# Patient Record
Sex: FEMALE | Race: WHITE | NOT HISPANIC OR LATINO | Employment: OTHER | URBAN - METROPOLITAN AREA
[De-identification: names, ages, dates, MRNs, and addresses within clinical notes are randomized per-mention and may not be internally consistent; named-entity substitution may affect disease eponyms.]

---

## 2023-04-17 DIAGNOSIS — I10 PRIMARY HYPERTENSION: ICD-10-CM

## 2023-04-17 PROBLEM — G89.29 CHRONIC NECK PAIN: Status: ACTIVE | Noted: 2023-04-17

## 2023-04-17 PROBLEM — I82.409 DEEP VENOUS THROMBOSIS (MULTI): Status: ACTIVE | Noted: 2023-04-17

## 2023-04-17 PROBLEM — M54.2 CHRONIC NECK PAIN: Status: ACTIVE | Noted: 2023-04-17

## 2023-04-17 PROBLEM — E55.9 VITAMIN D DEFICIENCY: Status: ACTIVE | Noted: 2023-04-17

## 2023-04-17 RX ORDER — LISINOPRIL 40 MG/1
40 TABLET ORAL DAILY
Qty: 90 TABLET | Refills: 1 | Status: SHIPPED | OUTPATIENT
Start: 2023-04-17 | End: 2023-10-12 | Stop reason: SDUPTHER

## 2023-04-17 RX ORDER — RIVAROXABAN 20 MG/1
1 TABLET, FILM COATED ORAL DAILY
COMMUNITY
Start: 2022-10-11 | End: 2023-08-14

## 2023-04-17 RX ORDER — ACETAMINOPHEN 500 MG
1 TABLET ORAL DAILY
COMMUNITY
Start: 2021-10-07

## 2023-04-17 RX ORDER — HYDROCORTISONE 25 MG/G
CREAM TOPICAL
COMMUNITY
Start: 2022-08-29

## 2023-04-17 RX ORDER — CETIRIZINE HCL 1 MG/ML
1 SOLUTION, ORAL ORAL DAILY
COMMUNITY
Start: 2022-08-29 | End: 2023-08-14

## 2023-04-17 RX ORDER — LISINOPRIL 40 MG/1
1 TABLET ORAL DAILY
COMMUNITY
End: 2023-04-17 | Stop reason: SDUPTHER

## 2023-07-19 PROBLEM — R73.03 PRE-DIABETES: Status: ACTIVE | Noted: 2023-07-19

## 2023-07-19 PROBLEM — M47.812 OA (OSTEOARTHRITIS) OF NECK: Status: ACTIVE | Noted: 2023-07-19

## 2023-07-19 PROBLEM — M50.30 MILD DEGENERATION OF CERVICAL INTERVERTEBRAL DISC: Status: ACTIVE | Noted: 2023-07-19

## 2023-07-19 PROBLEM — M81.0 OSTEOPOROSIS: Status: ACTIVE | Noted: 2023-07-19

## 2023-07-24 ENCOUNTER — APPOINTMENT (OUTPATIENT)
Dept: PRIMARY CARE | Facility: CLINIC | Age: 73
End: 2023-07-24

## 2023-07-25 ENCOUNTER — APPOINTMENT (OUTPATIENT)
Dept: PRIMARY CARE | Facility: CLINIC | Age: 73
End: 2023-07-25

## 2023-08-14 ENCOUNTER — OFFICE VISIT (OUTPATIENT)
Dept: PRIMARY CARE | Facility: CLINIC | Age: 73
End: 2023-08-14
Payer: MEDICARE

## 2023-08-14 VITALS
HEART RATE: 54 BPM | OXYGEN SATURATION: 97 % | WEIGHT: 159.2 LBS | DIASTOLIC BLOOD PRESSURE: 68 MMHG | RESPIRATION RATE: 16 BRPM | SYSTOLIC BLOOD PRESSURE: 120 MMHG | HEIGHT: 64 IN | TEMPERATURE: 96.3 F | BODY MASS INDEX: 27.18 KG/M2

## 2023-08-14 DIAGNOSIS — R73.03 PRE-DIABETES: ICD-10-CM

## 2023-08-14 DIAGNOSIS — Z12.31 ENCOUNTER FOR SCREENING MAMMOGRAM FOR BREAST CANCER: ICD-10-CM

## 2023-08-14 DIAGNOSIS — Z00.00 ROUTINE GENERAL MEDICAL EXAMINATION AT HEALTH CARE FACILITY: ICD-10-CM

## 2023-08-14 DIAGNOSIS — I10 PRIMARY HYPERTENSION: Primary | ICD-10-CM

## 2023-08-14 PROBLEM — I82.409 DEEP VENOUS THROMBOSIS (MULTI): Status: RESOLVED | Noted: 2023-04-17 | Resolved: 2023-08-14

## 2023-08-14 PROBLEM — T36.4X5A: Status: ACTIVE | Noted: 2023-08-14

## 2023-08-14 PROBLEM — K20.80: Status: ACTIVE | Noted: 2023-08-14

## 2023-08-14 PROBLEM — R73.01 ELEVATED FASTING GLUCOSE: Status: ACTIVE | Noted: 2022-08-08

## 2023-08-14 PROBLEM — L29.89 PRURITIC ERYTHEMATOUS RASH: Status: RESOLVED | Noted: 2023-08-14 | Resolved: 2023-08-14

## 2023-08-14 PROBLEM — L03.221 CELLULITIS OF NECK: Status: ACTIVE | Noted: 2023-08-14

## 2023-08-14 PROBLEM — I49.9 CARDIAC ARRHYTHMIA: Status: ACTIVE | Noted: 2023-08-14

## 2023-08-14 PROBLEM — L24.7 IRRITANT CONTACT DERMATITIS DUE TO PLANT: Status: RESOLVED | Noted: 2023-08-14 | Resolved: 2023-08-14

## 2023-08-14 PROBLEM — S46.019A TRAUMATIC ROTATOR CUFF TEAR: Status: ACTIVE | Noted: 2023-05-15

## 2023-08-14 PROBLEM — L29.9 ITCHY SKIN: Status: RESOLVED | Noted: 2023-08-14 | Resolved: 2023-08-14

## 2023-08-14 PROBLEM — R71.8 ELEVATED MCV: Status: ACTIVE | Noted: 2023-08-14

## 2023-08-14 PROBLEM — M67.911 TENDINOPATHY OF RIGHT ROTATOR CUFF: Status: ACTIVE | Noted: 2023-08-14

## 2023-08-14 PROBLEM — L30.4 INTERTRIGO: Status: ACTIVE | Noted: 2023-08-14

## 2023-08-14 PROBLEM — L29.8 PRURITIC ERYTHEMATOUS RASH: Status: RESOLVED | Noted: 2023-08-14 | Resolved: 2023-08-14

## 2023-08-14 PROCEDURE — 3078F DIAST BP <80 MM HG: CPT | Performed by: PHYSICIAN ASSISTANT

## 2023-08-14 PROCEDURE — 3074F SYST BP LT 130 MM HG: CPT | Performed by: PHYSICIAN ASSISTANT

## 2023-08-14 PROCEDURE — G0439 PPPS, SUBSEQ VISIT: HCPCS | Performed by: PHYSICIAN ASSISTANT

## 2023-08-14 PROCEDURE — 1160F RVW MEDS BY RX/DR IN RCRD: CPT | Performed by: PHYSICIAN ASSISTANT

## 2023-08-14 PROCEDURE — 1036F TOBACCO NON-USER: CPT | Performed by: PHYSICIAN ASSISTANT

## 2023-08-14 PROCEDURE — 1159F MED LIST DOCD IN RCRD: CPT | Performed by: PHYSICIAN ASSISTANT

## 2023-08-14 PROCEDURE — 1170F FXNL STATUS ASSESSED: CPT | Performed by: PHYSICIAN ASSISTANT

## 2023-08-14 ASSESSMENT — ACTIVITIES OF DAILY LIVING (ADL)
DRESSING: INDEPENDENT
MANAGING_FINANCES: INDEPENDENT
GROCERY_SHOPPING: INDEPENDENT
TAKING_MEDICATION: INDEPENDENT
BATHING: INDEPENDENT
DOING_HOUSEWORK: INDEPENDENT

## 2023-08-14 ASSESSMENT — ENCOUNTER SYMPTOMS
SHORTNESS OF BREATH: 0
VOMITING: 0
DEPRESSION: 0
DIARRHEA: 0
CONSTIPATION: 0
EYE PAIN: 0
NAUSEA: 0
OCCASIONAL FEELINGS OF UNSTEADINESS: 0
ABDOMINAL PAIN: 0
MYALGIAS: 0
ARTHRALGIAS: 0
LOSS OF SENSATION IN FEET: 0

## 2023-08-14 ASSESSMENT — PATIENT HEALTH QUESTIONNAIRE - PHQ9
SUM OF ALL RESPONSES TO PHQ9 QUESTIONS 1 AND 2: 0
1. LITTLE INTEREST OR PLEASURE IN DOING THINGS: NOT AT ALL
2. FEELING DOWN, DEPRESSED OR HOPELESS: NOT AT ALL

## 2023-08-14 NOTE — PROGRESS NOTES
"Subjective   Reason for Visit: Cherie Eugene is an 72 y.o. female here for a Medicare Wellness visit.     Past Medical, Surgical, and Family History reviewed and updated in chart.    Reviewed all medications by prescribing practitioner or clinical pharmacist (such as prescriptions, OTCs, herbal therapies and supplements) and documented in the medical record.    HPI    Patient Care Team:  Milka Frye PA-C as PCP - General  Milka Frye PA-C as PCP - [employer] PCP     Medicare Wellness:  - Lives at home in [place] with  - home life is good    - Hobbies/ activities: daughter and grandkids live nearby   - Exercise: walks every day at the park about 30 minutes   - Diet: balanced, no sugar, not much bread, eating grass fed beef now   - Sexual hx: not anymore   - Tobacco: never   - Alcohol: wine or beer every night 1-2 glasses   - Illicit drugs: no   - Depression/ mood: no   - Cognitive:  - Hearing:   - Falls:   - Healthcare POA/ advanced directives:  - Code status desired:   - Labs: DUE - just wants lipid and A1c   - DEXA: UTD   - Colon CA: UTD   - Mamm: DUE   - Shingrix: DUE, DECLINED   - Pneumovax/ Prevnar: UTD   - Td: DUE   - COVID-19 vax: DUE, DECLINED       Review of Systems   Eyes:  Negative for pain and visual disturbance.   Respiratory:  Negative for shortness of breath.    Cardiovascular:  Negative for chest pain.   Gastrointestinal:  Negative for abdominal pain, constipation, diarrhea, nausea and vomiting.   Musculoskeletal:  Negative for arthralgias and myalgias.   Skin:  Negative for rash.       Objective   Vitals:  /68   Pulse 54   Temp 35.7 °C (96.3 °F)   Resp 16   Ht 1.626 m (5' 4\")   Wt 72.2 kg (159 lb 3.2 oz)   SpO2 97%   BMI 27.33 kg/m²       Physical Exam  Constitutional:       General: She is not in acute distress.     Appearance: She is normal weight.   HENT:      Head: Normocephalic.      Right Ear: Tympanic membrane and ear canal normal.      Left Ear: " Tympanic membrane and ear canal normal.      Nose: Nose normal.      Mouth/Throat:      Mouth: Mucous membranes are moist.      Pharynx: Oropharynx is clear.   Eyes:      Extraocular Movements: Extraocular movements intact.      Conjunctiva/sclera: Conjunctivae normal.      Pupils: Pupils are equal, round, and reactive to light.   Cardiovascular:      Rate and Rhythm: Normal rate and regular rhythm.      Pulses: Normal pulses.      Heart sounds: No murmur heard.  Pulmonary:      Effort: Pulmonary effort is normal.      Breath sounds: Normal breath sounds. No wheezing, rhonchi or rales.   Abdominal:      General: Bowel sounds are normal. There is no distension.      Palpations: Abdomen is soft. There is no mass.      Tenderness: There is no abdominal tenderness. There is no guarding.   Musculoskeletal:         General: Normal range of motion.      Cervical back: Neck supple.   Lymphadenopathy:      Cervical: No cervical adenopathy.   Skin:     General: Skin is warm and dry.      Findings: No lesion or rash.   Neurological:      General: No focal deficit present.      Mental Status: She is alert.      Gait: Gait normal.   Psychiatric:         Mood and Affect: Mood normal.         Assessment/Plan   Problem List Items Addressed This Visit       HTN (hypertension) - Primary    Relevant Orders    Lipid Panel    Comprehensive Metabolic Panel    Pre-diabetes    Relevant Orders    Hemoglobin A1C    Lipid Panel     Other Visit Diagnoses       Encounter for screening mammogram for breast cancer        Relevant Orders    BI mammo bilateral screening tomosynthesis

## 2023-08-18 ENCOUNTER — TELEPHONE (OUTPATIENT)
Dept: PRIMARY CARE | Facility: CLINIC | Age: 73
End: 2023-08-18

## 2023-08-18 NOTE — TELEPHONE ENCOUNTER
Patient phones the office today w/ c/o pain in her right foot and leg when it is moved.     Patient denies any swelling and warm to the touch.     Patient did have a blood clot in this leg about one year ago and she states it does not feel the same.     Patient was advised to go to ER and/or [facility] today if needed.     Please advise.     Thank you.

## 2023-08-21 NOTE — PROGRESS NOTES
"Subjective   Patient ID: Cherie Eugene is a 72 y.o. female who presents for Pain (Pt here today for right foot pain/swelling that happened last Thursday-Fri but today much better. Could barely walk on it but better now. ) and Medication Question (Pt wants to know if she can take Turmeric with all her other meds. ).    HPI     Right foot/ ankle pain:   - Onset: Thursday  - Description: painful and initially swollen   - Location: right ankle   - Pain severity: 2/10 now so MUCH better than it was the other day   - Aggravated by: walking up and down stairs (which she does a lot)   - Txs tried: Advil, soak in Epson salts   - Previous injury/ surgery: no     Review of Systems   Musculoskeletal:  Positive for arthralgias (right ankle pain).       Objective   /86   Pulse 53   Temp 36.2 °C (97.1 °F)   Resp 18   Ht 1.626 m (5' 4\")   Wt 72.1 kg (159 lb)   SpO2 99%   BMI 27.29 kg/m²     Physical Exam  Musculoskeletal:      Right ankle: No swelling, deformity, ecchymosis or lacerations. Tenderness present over the lateral malleolus. Normal range of motion.      Right Achilles Tendon: Normal.       Assessment/Plan   Problem List Items Addressed This Visit    None  Visit Diagnoses       Right ankle tendonitis    -  Primary        Sxs greatly improved at this point  Encouraged continued rest  Add ice and topical diclofenac for discomfort   Follow up if sxs worsen again or any concerns        "

## 2023-08-22 ENCOUNTER — OFFICE VISIT (OUTPATIENT)
Dept: PRIMARY CARE | Facility: CLINIC | Age: 73
End: 2023-08-22
Payer: MEDICARE

## 2023-08-22 VITALS
HEART RATE: 53 BPM | BODY MASS INDEX: 27.14 KG/M2 | DIASTOLIC BLOOD PRESSURE: 86 MMHG | SYSTOLIC BLOOD PRESSURE: 128 MMHG | WEIGHT: 159 LBS | HEIGHT: 64 IN | OXYGEN SATURATION: 99 % | RESPIRATION RATE: 18 BRPM | TEMPERATURE: 97.1 F

## 2023-08-22 DIAGNOSIS — M77.51 RIGHT ANKLE TENDONITIS: Primary | ICD-10-CM

## 2023-08-22 PROCEDURE — 99212 OFFICE O/P EST SF 10 MIN: CPT | Performed by: PHYSICIAN ASSISTANT

## 2023-08-22 PROCEDURE — 1159F MED LIST DOCD IN RCRD: CPT | Performed by: PHYSICIAN ASSISTANT

## 2023-08-22 PROCEDURE — 3079F DIAST BP 80-89 MM HG: CPT | Performed by: PHYSICIAN ASSISTANT

## 2023-08-22 PROCEDURE — 3074F SYST BP LT 130 MM HG: CPT | Performed by: PHYSICIAN ASSISTANT

## 2023-08-22 PROCEDURE — 1036F TOBACCO NON-USER: CPT | Performed by: PHYSICIAN ASSISTANT

## 2023-08-22 PROCEDURE — 1160F RVW MEDS BY RX/DR IN RCRD: CPT | Performed by: PHYSICIAN ASSISTANT

## 2023-08-22 ASSESSMENT — ENCOUNTER SYMPTOMS: ARTHRALGIAS: 1

## 2023-09-11 ENCOUNTER — APPOINTMENT (OUTPATIENT)
Dept: PRIMARY CARE | Facility: CLINIC | Age: 73
End: 2023-09-11

## 2023-10-12 DIAGNOSIS — I10 PRIMARY HYPERTENSION: ICD-10-CM

## 2023-10-12 RX ORDER — LISINOPRIL 40 MG/1
40 TABLET ORAL DAILY
Qty: 90 TABLET | Refills: 1 | Status: SHIPPED | OUTPATIENT
Start: 2023-10-12

## 2023-10-12 NOTE — TELEPHONE ENCOUNTER
Patient phones the office today w/ request to refill her Lisinopril 40mg 1 TAB every day for a 90 day supply to be sent to [facility] on [address] in [place].     Thank you.

## 2023-12-18 ENCOUNTER — OFFICE VISIT (OUTPATIENT)
Dept: PRIMARY CARE | Facility: CLINIC | Age: 73
End: 2023-12-18
Payer: MEDICARE

## 2023-12-18 ENCOUNTER — LAB (OUTPATIENT)
Dept: LAB | Facility: LAB | Age: 73
End: 2023-12-18
Payer: MEDICARE

## 2023-12-18 VITALS
SYSTOLIC BLOOD PRESSURE: 128 MMHG | WEIGHT: 162 LBS | RESPIRATION RATE: 18 BRPM | DIASTOLIC BLOOD PRESSURE: 86 MMHG | OXYGEN SATURATION: 97 % | TEMPERATURE: 95.9 F | HEIGHT: 64 IN | HEART RATE: 69 BPM | BODY MASS INDEX: 27.66 KG/M2

## 2023-12-18 DIAGNOSIS — I10 PRIMARY HYPERTENSION: ICD-10-CM

## 2023-12-18 DIAGNOSIS — M25.562 PAIN IN BOTH KNEES, UNSPECIFIED CHRONICITY: Primary | ICD-10-CM

## 2023-12-18 DIAGNOSIS — M25.561 PAIN IN BOTH KNEES, UNSPECIFIED CHRONICITY: Primary | ICD-10-CM

## 2023-12-18 DIAGNOSIS — R73.03 PRE-DIABETES: ICD-10-CM

## 2023-12-18 DIAGNOSIS — E55.9 VITAMIN D DEFICIENCY: ICD-10-CM

## 2023-12-18 LAB
ALBUMIN SERPL BCP-MCNC: 4.7 G/DL (ref 3.4–5)
ALP SERPL-CCNC: 58 U/L (ref 33–136)
ALT SERPL W P-5'-P-CCNC: 13 U/L (ref 7–45)
ANION GAP SERPL CALC-SCNC: 14 MMOL/L (ref 10–20)
AST SERPL W P-5'-P-CCNC: 15 U/L (ref 9–39)
BILIRUB SERPL-MCNC: 0.6 MG/DL (ref 0–1.2)
BUN SERPL-MCNC: 20 MG/DL (ref 6–23)
CALCIUM SERPL-MCNC: 9.7 MG/DL (ref 8.6–10.3)
CHLORIDE SERPL-SCNC: 102 MMOL/L (ref 98–107)
CHOLEST SERPL-MCNC: 245 MG/DL (ref 0–199)
CHOLESTEROL/HDL RATIO: 2.8
CO2 SERPL-SCNC: 28 MMOL/L (ref 21–32)
CREAT SERPL-MCNC: 0.93 MG/DL (ref 0.5–1.05)
ERYTHROCYTE [DISTWIDTH] IN BLOOD BY AUTOMATED COUNT: 12.8 % (ref 11.5–14.5)
EST. AVERAGE GLUCOSE BLD GHB EST-MCNC: 108 MG/DL
GFR SERPL CREATININE-BSD FRML MDRD: 65 ML/MIN/1.73M*2
GLUCOSE SERPL-MCNC: 89 MG/DL (ref 74–99)
HBA1C MFR BLD: 5.4 %
HCT VFR BLD AUTO: 45 % (ref 36–46)
HDLC SERPL-MCNC: 87.4 MG/DL
HGB BLD-MCNC: 15 G/DL (ref 12–16)
LDLC SERPL CALC-MCNC: 138 MG/DL
MCH RBC QN AUTO: 32.7 PG (ref 26–34)
MCHC RBC AUTO-ENTMCNC: 33.3 G/DL (ref 32–36)
MCV RBC AUTO: 98 FL (ref 80–100)
NON HDL CHOLESTEROL: 158 MG/DL (ref 0–149)
NRBC BLD-RTO: 0 /100 WBCS (ref 0–0)
PLATELET # BLD AUTO: 245 X10*3/UL (ref 150–450)
POTASSIUM SERPL-SCNC: 4.4 MMOL/L (ref 3.5–5.3)
PROT SERPL-MCNC: 6.9 G/DL (ref 6.4–8.2)
RBC # BLD AUTO: 4.59 X10*6/UL (ref 4–5.2)
SODIUM SERPL-SCNC: 140 MMOL/L (ref 136–145)
TRIGL SERPL-MCNC: 96 MG/DL (ref 0–149)
VLDL: 19 MG/DL (ref 0–40)
WBC # BLD AUTO: 6.2 X10*3/UL (ref 4.4–11.3)

## 2023-12-18 PROCEDURE — 80053 COMPREHEN METABOLIC PANEL: CPT

## 2023-12-18 PROCEDURE — 1160F RVW MEDS BY RX/DR IN RCRD: CPT | Performed by: PHYSICIAN ASSISTANT

## 2023-12-18 PROCEDURE — 1159F MED LIST DOCD IN RCRD: CPT | Performed by: PHYSICIAN ASSISTANT

## 2023-12-18 PROCEDURE — 3074F SYST BP LT 130 MM HG: CPT | Performed by: PHYSICIAN ASSISTANT

## 2023-12-18 PROCEDURE — 3079F DIAST BP 80-89 MM HG: CPT | Performed by: PHYSICIAN ASSISTANT

## 2023-12-18 PROCEDURE — 99212 OFFICE O/P EST SF 10 MIN: CPT | Performed by: PHYSICIAN ASSISTANT

## 2023-12-18 PROCEDURE — 36415 COLL VENOUS BLD VENIPUNCTURE: CPT

## 2023-12-18 PROCEDURE — 85027 COMPLETE CBC AUTOMATED: CPT

## 2023-12-18 PROCEDURE — 83036 HEMOGLOBIN GLYCOSYLATED A1C: CPT

## 2023-12-18 PROCEDURE — 80061 LIPID PANEL: CPT

## 2023-12-18 PROCEDURE — 1036F TOBACCO NON-USER: CPT | Performed by: PHYSICIAN ASSISTANT

## 2023-12-18 ASSESSMENT — ENCOUNTER SYMPTOMS: ARTHRALGIAS: 1

## 2023-12-18 NOTE — PROGRESS NOTES
"Subjective   Patient ID: Cherie Eugene is a 73 y.o. female who presents for Follow-up (Pt here today for a follow up and needing some lab orders. ) and Knee Pain (Pt knees have been bothering her; bilateral knee pain but left is worse past couple days; probably due to weather. ).      B/l knee pain:   - Onset: Past few days  - Description: Typical arthritic pains, dull, shooting pains  - Location: Bilateral but L worse than right  - Associated with: Increased activity or higher intensity activity  - Aggravated by: Getting up from seat, climbing stairs  - Txs tried: Advil, got a knee injection in R knee 4-5 years ago which resolved the pain, but seems to feel this pain now is somewhat different .   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       Objective   /86   Pulse 69   Temp 35.5 °C (95.9 °F)   Resp 18   Ht 1.626 m (5' 4\")   Wt 73.5 kg (162 lb)   SpO2 97%   BMI 27.81 kg/m²     Physical Exam  Constitutional:       Appearance: Normal appearance.   Cardiovascular:      Rate and Rhythm: Normal rate and regular rhythm.      Pulses: Normal pulses.      Heart sounds: Normal heart sounds. No murmur heard.  Pulmonary:      Effort: Pulmonary effort is normal.      Breath sounds: Normal breath sounds.   Musculoskeletal:      Right knee: No swelling, deformity, effusion, erythema, bony tenderness or crepitus. Normal range of motion. No tenderness. No LCL laxity, MCL laxity, ACL laxity or PCL laxity.      Instability Tests: Anterior drawer test negative. Medial Fanny test negative and lateral Fanny test negative.      Left knee: Crepitus present. No swelling, deformity, effusion, erythema or bony tenderness. Normal range of motion. Tenderness present over the medial joint line. No LCL laxity, MCL laxity, ACL laxity or PCL laxity.     Instability Tests: Anterior drawer " test negative. Medial Fanny test negative and lateral Fanny test negative.   Neurological:      Mental Status: She is alert.   Psychiatric:         Mood and Affect: Mood and affect normal.       Assessment/Plan     Problem List Items Addressed This Visit       HTN (hypertension)    Overview     - Has been stable and well controlled on lisinopril 40 mg          Current Assessment & Plan     - continue current tx plan         Relevant Orders    CBC    Vitamin D deficiency    Pain in both knees - Primary    Current Assessment & Plan     - Likely osteoarithis   - Will check b/l knee xrays  - Advised conservative measures - topical Voltaren, rest  - Offered P.T. - pt declined for now   - Will follow up if sxs worsen, persist or any new concerns          Relevant Orders    XR knee 3 views bilateral       I was present with the PA student who participated in the documentation of this note. I have personally seen and re-examined the patient and performed the medical decision-making components (assessment and plan of care). I have reviewed the PA student documentation and verified the findings in the note as written with additions or exceptions as stated in the body of this note.    Milka Frye PA-C

## 2023-12-18 NOTE — ASSESSMENT & PLAN NOTE
- Likely osteoarithis   - Will check b/l knee xrays  - Advised conservative measures - topical Voltaren, rest  - Offered P.T. - pt declined for now   - Will follow up if sxs worsen, persist or any new concerns

## 2023-12-18 NOTE — PROGRESS NOTES
"Subjective   Patient ID: Cherie Eugene is a 73 y.o. female who presents for Follow-up (Pt here today for a follow up and needing some lab orders. ) and Knee Pain (Pt knees have been bothering her; bilateral knee pain but left is worse past couple days; probably due to weather. ).    HPI     B/l knee pain:   - Onset:   - Description:   - Location:   - Associated with:  - Aggravated by:  - Txs tried:   - Previous injury/ surgery:     Review of Systems   Musculoskeletal:  Positive for arthralgias.       Objective   /86   Pulse 69   Temp 35.5 °C (95.9 °F)   Resp 18   Ht 1.626 m (5' 4\")   Wt 73.5 kg (162 lb)   SpO2 97%   BMI 27.81 kg/m²     Physical Exam  Constitutional:       Appearance: Normal appearance.   Cardiovascular:      Rate and Rhythm: Normal rate and regular rhythm.      Pulses: Normal pulses.      Heart sounds: Normal heart sounds. No murmur heard.  Pulmonary:      Effort: Pulmonary effort is normal.      Breath sounds: Normal breath sounds.   Neurological:      Mental Status: She is alert.   Psychiatric:         Mood and Affect: Mood and affect normal.         Assessment/Plan     Problem List Items Addressed This Visit    None  Visit Diagnoses       Pain in both knees, unspecified chronicity    -  Primary            "

## 2023-12-21 ENCOUNTER — HOSPITAL ENCOUNTER (OUTPATIENT)
Dept: RADIOLOGY | Facility: HOSPITAL | Age: 73
Discharge: HOME | End: 2023-12-21
Payer: MEDICARE

## 2023-12-21 DIAGNOSIS — M25.561 PAIN IN BOTH KNEES, UNSPECIFIED CHRONICITY: ICD-10-CM

## 2023-12-21 DIAGNOSIS — M25.562 PAIN IN BOTH KNEES, UNSPECIFIED CHRONICITY: ICD-10-CM

## 2023-12-21 PROCEDURE — 73562 X-RAY EXAM OF KNEE 3: CPT | Mod: BILATERAL PROCEDURE | Performed by: RADIOLOGY

## 2023-12-21 PROCEDURE — 73562 X-RAY EXAM OF KNEE 3: CPT | Mod: 50

## 2024-01-02 DIAGNOSIS — M17.0 PRIMARY OSTEOARTHRITIS OF BOTH KNEES: Primary | ICD-10-CM

## 2024-01-02 RX ORDER — MELOXICAM 15 MG/1
15 TABLET ORAL DAILY
Qty: 90 TABLET | Refills: 1 | Status: SHIPPED | OUTPATIENT
Start: 2024-01-02

## 2024-02-15 ENCOUNTER — OFFICE VISIT (OUTPATIENT)
Dept: ORTHOPEDIC SURGERY | Facility: CLINIC | Age: 74
End: 2024-02-15
Payer: COMMERCIAL

## 2024-02-15 ENCOUNTER — HOSPITAL ENCOUNTER (OUTPATIENT)
Dept: RADIOLOGY | Facility: CLINIC | Age: 74
Discharge: HOME | End: 2024-02-15
Payer: COMMERCIAL

## 2024-02-15 DIAGNOSIS — M70.61 TROCHANTERIC BURSITIS OF RIGHT HIP: ICD-10-CM

## 2024-02-15 DIAGNOSIS — M54.50 LUMBAR PAIN: ICD-10-CM

## 2024-02-15 PROCEDURE — 72100 X-RAY EXAM L-S SPINE 2/3 VWS: CPT

## 2024-02-15 PROCEDURE — 99214 OFFICE O/P EST MOD 30 MIN: CPT | Performed by: FAMILY MEDICINE

## 2024-02-15 PROCEDURE — 1159F MED LIST DOCD IN RCRD: CPT | Performed by: FAMILY MEDICINE

## 2024-02-15 PROCEDURE — 73502 X-RAY EXAM HIP UNI 2-3 VIEWS: CPT | Mod: RT

## 2024-02-15 PROCEDURE — 73502 X-RAY EXAM HIP UNI 2-3 VIEWS: CPT | Mod: RIGHT SIDE | Performed by: FAMILY MEDICINE

## 2024-02-15 PROCEDURE — 72100 X-RAY EXAM L-S SPINE 2/3 VWS: CPT | Performed by: FAMILY MEDICINE

## 2024-02-15 PROCEDURE — 1036F TOBACCO NON-USER: CPT | Performed by: FAMILY MEDICINE

## 2024-02-15 PROCEDURE — 1160F RVW MEDS BY RX/DR IN RCRD: CPT | Performed by: FAMILY MEDICINE

## 2024-02-15 RX ORDER — METHYLPREDNISOLONE 4 MG/1
TABLET ORAL
Qty: 1 EACH | Refills: 0 | Status: SHIPPED | OUTPATIENT
Start: 2024-02-15

## 2024-02-15 NOTE — PROGRESS NOTES
Acute Injury New Patient Visit    CC:   Chief Complaint   Patient presents with    Lower Back - Pain     Rt hip low back pain   Xrays today       HPI: Cherie is a 73 y.o.female who presents today with new complaints of lateral sided right hip pain.  She points to the greater trochanter bursa region as area most pain discomfort.  She denies any numbness tingling or burning.  She states 3 days onset of pain without any obvious injury or trauma.  She states she is on meloxicam chronically for her knees.  She has difficulty navigating up and down stairs as well as sleeping on her right side.  Symptoms have been going on and progressing over the last 3 days.        Review of Systems   GENERAL: Negative for malaise, significant weight loss, fever  MUSCULOSKELETAL: See HPI  NEURO: Negative for numbness / tingling     Past Medical History  Past Medical History:   Diagnosis Date    Irritant contact dermatitis due to plant 08/14/2023    Itchy skin 08/14/2023    Pain in right shoulder 01/27/2020    Acute pain of right shoulder    Personal history of other benign neoplasm     History of other benign neoplasm    Personal history of other diseases of the circulatory system     History of sinus bradycardia    Pruritic erythematous rash 08/14/2023       Medication review  Medication Documentation Review Audit       Reviewed by Cole C Budinsky, MD (Physician) on 02/15/24 at 1202      Medication Order Taking? Sig Documenting Provider Last Dose Status   cholecalciferol (Vitamin D-3) 50 mcg (2,000 unit) capsule [id number] No Take 1 capsule (50 mcg) by mouth once daily. Historical Provider, MD Taking Active   hydrocortisone 2.5 % cream [id number] No Apply topically. Historical Provider, MD Taking Active   lisinopril 40 mg tablet [id number] No Take 1 tablet (40 mg) by mouth once daily. Milka Frye PA-C Taking Active   meloxicam (Mobic) 15 mg tablet [id number]  Take 1 tablet (15 mg) by mouth once daily. Milka Frye PA-C  Active    methylPREDNISolone (Medrol Dospak) 4 mg tablets [id number]  Follow schedule on package instructions Cole C Budinsky, MD  Active                    Allergies  No Known Allergies    Social History  Social History     Socioeconomic History    Marital status:      Spouse name: Not on file    Number of children: Not on file    Years of education: Not on file    Highest education level: Not on file   Occupational History    Not on file   Tobacco Use    Smoking status: Never    Smokeless tobacco: Never   Vaping Use    Vaping Use: Never used   Substance and Sexual Activity    Alcohol use: Yes     Comment: occ    Drug use: Never    Sexual activity: Not on file   Other Topics Concern    Not on file   Social History Narrative    Not on file     Social Determinants of Health     Financial Resource Strain: Not on file   Food Insecurity: Not on file   Transportation Needs: Not on file   Physical Activity: Not on file   Stress: Not on file   Social Connections: Not on file   Intimate Partner Violence: Not on file   Housing Stability: Not on file       Surgical History  Past Surgical History:   Procedure Laterality Date    APPENDECTOMY      Appendectomy    WISDOM TOOTH EXTRACTION  2001    [place] tooth extraction       Physical Exam:  GENERAL:  Patient is awake, alert, and oriented to person place and time.  Patient appears well nourished and well kept.  Affect Calm, Not Acutely Distressed.  HEENT:  Normocephalic, Atraumatic, EOMI  CARDIOVASCULAR:  Hemodynamically stable.  RESPIRATORY:  Normal respirations with unlabored breathing.  NEURO: Gross sensation intact to the lower extremities bilaterally.  Extremity: Right hip exam demonstrates negative logroll tenderness palpation over the greater trochanter bursa minimal IT band pain proximally.  She has normal hip flexion internal and external rotation all with reproduction of symptoms.  Mild antalgic gait noted.      Diagnostics: X-rays today negative for fracture or  dislocation with mild osteoarthritis about the hip        Procedure: None  Procedures    Assessment:   Problem List Items Addressed This Visit    None  Visit Diagnoses       Lumbar pain        Relevant Medications    methylPREDNISolone (Medrol Dospak) 4 mg tablets    Other Relevant Orders    XR hip right with pelvis when performed 2 or 3 views    XR lumbar spine 2-3 views    Trochanteric bursitis of right hip        Relevant Medications    methylPREDNISolone (Medrol Dospak) 4 mg tablets             Plan: At this time we discussed conservative management to the inflammation and irritation to the lateral side of the hip.  Will offer her short course of an oral steroid pack in addition to recommendations for icing and some home exercises to work on range of motion and stretching.  Will see her back in 2 weeks if she still having pain and discomfort we will consider an injection at that time.  No need for repeat x-rays.  Orders Placed This Encounter    XR hip right with pelvis when performed 2 or 3 views    XR lumbar spine 2-3 views    methylPREDNISolone (Medrol Dospak) 4 mg tablets      At the conclusion of the visit there were no further questions by the patient/family regarding their plan of care.  Patient was instructed to call or return with any issues, questions, or concerns regarding their injury and/or treatment plan described above.     02/15/24 at 12:12 PM - Cole C Budinsky, MD    Office: [phone]    This note was prepared using voice recognition software.  The details of this note are correct and have been reviewed, and corrected to the best of my ability.  Some grammatical errors may persist related to the Dragon software.

## 2024-03-01 ENCOUNTER — APPOINTMENT (OUTPATIENT)
Dept: ORTHOPEDIC SURGERY | Facility: CLINIC | Age: 74
End: 2024-03-01
Payer: COMMERCIAL

## 2024-05-13 PROBLEM — Z00.00 ANNUAL PHYSICAL EXAM: Status: ACTIVE | Noted: 2024-05-13

## 2024-05-13 PROBLEM — Z71.89 CARDIAC RISK COUNSELING: Status: ACTIVE | Noted: 2024-05-13

## 2024-05-13 PROBLEM — Z71.89 ADVANCED DIRECTIVES, COUNSELING/DISCUSSION: Status: ACTIVE | Noted: 2024-05-13

## 2024-05-15 ENCOUNTER — OFFICE VISIT (OUTPATIENT)
Dept: PRIMARY CARE | Facility: CLINIC | Age: 74
End: 2024-05-15
Payer: COMMERCIAL

## 2024-05-15 VITALS
HEIGHT: 64 IN | RESPIRATION RATE: 16 BRPM | BODY MASS INDEX: 27.49 KG/M2 | WEIGHT: 161 LBS | SYSTOLIC BLOOD PRESSURE: 150 MMHG | OXYGEN SATURATION: 98 % | DIASTOLIC BLOOD PRESSURE: 74 MMHG | TEMPERATURE: 97.1 F | HEART RATE: 63 BPM

## 2024-05-15 DIAGNOSIS — R73.03 PRE-DIABETES: ICD-10-CM

## 2024-05-15 DIAGNOSIS — Z12.11 SCREENING FOR COLON CANCER: ICD-10-CM

## 2024-05-15 DIAGNOSIS — Z13.820 SCREENING FOR OSTEOPOROSIS: ICD-10-CM

## 2024-05-15 DIAGNOSIS — Z00.00 ANNUAL PHYSICAL EXAM: ICD-10-CM

## 2024-05-15 DIAGNOSIS — I10 PRIMARY HYPERTENSION: ICD-10-CM

## 2024-05-15 DIAGNOSIS — M81.0 AGE-RELATED OSTEOPOROSIS WITHOUT CURRENT PATHOLOGICAL FRACTURE: ICD-10-CM

## 2024-05-15 DIAGNOSIS — Z12.31 ENCOUNTER FOR SCREENING MAMMOGRAM FOR BREAST CANCER: ICD-10-CM

## 2024-05-15 DIAGNOSIS — Z71.89 CARDIAC RISK COUNSELING: ICD-10-CM

## 2024-05-15 DIAGNOSIS — Z13.89 SCREENING FOR MULTIPLE CONDITIONS: ICD-10-CM

## 2024-05-15 DIAGNOSIS — Z78.0 POSTMENOPAUSAL: ICD-10-CM

## 2024-05-15 DIAGNOSIS — E55.9 VITAMIN D DEFICIENCY: ICD-10-CM

## 2024-05-15 DIAGNOSIS — Z71.89 ADVANCED DIRECTIVES, COUNSELING/DISCUSSION: Primary | ICD-10-CM

## 2024-05-15 DIAGNOSIS — Z91.89 AT RISK FOR DECREASED BONE DENSITY: ICD-10-CM

## 2024-05-15 PROCEDURE — 1158F ADVNC CARE PLAN TLK DOCD: CPT | Performed by: PHYSICIAN ASSISTANT

## 2024-05-15 PROCEDURE — G0439 PPPS, SUBSEQ VISIT: HCPCS | Performed by: PHYSICIAN ASSISTANT

## 2024-05-15 PROCEDURE — 1170F FXNL STATUS ASSESSED: CPT | Performed by: PHYSICIAN ASSISTANT

## 2024-05-15 PROCEDURE — 99497 ADVNCD CARE PLAN 30 MIN: CPT | Performed by: PHYSICIAN ASSISTANT

## 2024-05-15 PROCEDURE — 1159F MED LIST DOCD IN RCRD: CPT | Performed by: PHYSICIAN ASSISTANT

## 2024-05-15 PROCEDURE — G0446 INTENS BEHAVE THER CARDIO DX: HCPCS | Performed by: PHYSICIAN ASSISTANT

## 2024-05-15 PROCEDURE — 1160F RVW MEDS BY RX/DR IN RCRD: CPT | Performed by: PHYSICIAN ASSISTANT

## 2024-05-15 PROCEDURE — G0444 DEPRESSION SCREEN ANNUAL: HCPCS | Performed by: PHYSICIAN ASSISTANT

## 2024-05-15 PROCEDURE — 3077F SYST BP >= 140 MM HG: CPT | Performed by: PHYSICIAN ASSISTANT

## 2024-05-15 PROCEDURE — 1123F ACP DISCUSS/DSCN MKR DOCD: CPT | Performed by: PHYSICIAN ASSISTANT

## 2024-05-15 PROCEDURE — 1036F TOBACCO NON-USER: CPT | Performed by: PHYSICIAN ASSISTANT

## 2024-05-15 PROCEDURE — 3078F DIAST BP <80 MM HG: CPT | Performed by: PHYSICIAN ASSISTANT

## 2024-05-15 RX ORDER — HYDROCHLOROTHIAZIDE 12.5 MG/1
TABLET ORAL
Qty: 90 TABLET | Refills: 1 | Status: SHIPPED | OUTPATIENT
Start: 2024-05-15

## 2024-05-15 ASSESSMENT — ACTIVITIES OF DAILY LIVING (ADL)
GROCERY_SHOPPING: INDEPENDENT
DRESSING: INDEPENDENT
MANAGING_FINANCES: INDEPENDENT
DOING_HOUSEWORK: INDEPENDENT
TAKING_MEDICATION: INDEPENDENT
BATHING: INDEPENDENT

## 2024-05-15 ASSESSMENT — ENCOUNTER SYMPTOMS
LOSS OF SENSATION IN FEET: 0
DEPRESSION: 0
OCCASIONAL FEELINGS OF UNSTEADINESS: 0

## 2024-05-15 NOTE — ASSESSMENT & PLAN NOTE
PREVENTIVE CARE SCREENING:  - Labs: UTD  - Cologuard/ Colonoscopy: UTD - ordered ahead - due in October   Vaccines:   - Shingles Vaccine (start at 50): Declined   - Pneumonia vax: UTD   - Tetanus (q10yrs): DECLINED  Women's health:  - Mammogram: UTD, ordered ahead for August   - DEXA scan: DUE, ordered today   Lifestyle Modification:  - Discussed DIET -   limit snacks, processed foods, sugary and greasy foods, fast foods. Increase healthy alternatives, whole grains, fruits vegetables.  - Encouraged to take daily multivitamin.    - Discussed EXERCISE -   Recommended weight training for bone health and 30 minutes of cardiovascular exercise 5-7 days a week.  - Encouraged pt to get yearly eye and dental exams

## 2024-05-15 NOTE — ASSESSMENT & PLAN NOTE
- BP running high today   - She says SBP usually in 130s-140s at home   - Recommend we add hydrochlorothiazide 12.5 mg to current tx plan   - Follow up 3 months

## 2024-05-15 NOTE — ASSESSMENT & PLAN NOTE
- Cardiovascular risk was discussed today   - Pt's 10 year ASCVD risk today is 17.3%   - Lifestyle modifications were recommended, including nutritional choices, exercise, and elimination of habits contributing to risk.    - We agreed on a plan to reduce the current cardiovascular risk.    - Aspirin use/disuse was discussed after reviewing updated guidelines.   - CT cardiac score was ordered today

## 2024-05-15 NOTE — ASSESSMENT & PLAN NOTE
- I offered to discuss advanced directives with Cherie  today - she  voluntarily choose to proceed with the discussion   - Present for the discussion was: Cherie and myself  - I provided an explanation of living will, healthcare power of , DNR orders. Pt was given handout from [employer] to complete and return to be scanned into her medical record.  - If pt is unable to make their own medical decisions, she wishes for her , Joseph, to be consulted first as POA

## 2024-05-15 NOTE — PROGRESS NOTES
Medicare Wellness Exam/Comprehensive Problem Focused Follow Up and Physical Exam  Chief Complaint   Patient presents with    Medicare Annual Wellness Visit Subsequent     Regular check up     HPI:    Preventive:   - Lives at home in [place] with Joseph   - Retired   - Labs: UTD   - Colon CA: UTD, will be due in October   - Mamm: UTD, will be due 8/19/23   - DEXA: DUE   - Shingrix: DUE, DECLINED   - Pneumovax/ Prevnar: UTD   - Td: DECLINED   - Diet: trying to eat healthier   - Exercise: not much, used to walk every day but not consistent with it   - Sexual hx: no   - Tobacco: none   - Illicit drugs: none   - Alcohol: one to 2 glass of wine per night, beer on occasional   - Mood: normal   - Dentist visits: utd   - Eye exams: utd     Assessment and Plan:  Problem List Items Addressed This Visit       HTN (hypertension)    Overview     - Current med: lisinopril 40 mg          Current Assessment & Plan     - BP running high today   - She says SBP usually in 130s-140s at home   - Recommend we add hydrochlorothiazide 12.5 mg to current tx plan   - Follow up 3 months          Relevant Medications    hydroCHLOROthiazide (Microzide) 12.5 mg tablet    Vitamin D deficiency    Overview     - Most recent vit D level was 52 (7/22/22)  - Current tx: vitamin D3 2,000IU daily          Current Assessment & Plan     - Has been stable  - Continue current tx         Relevant Orders    Vitamin D 25-Hydroxy,Total (for eval of Vitamin D levels)    RESOLVED: Osteoporosis    Pre-diabetes    Overview     - Most recent A1c was 5.4% (12/18/23) down from 5.8% prior          Current Assessment & Plan     - Has been stable  - Continue healthy lifestyle modifications          Annual physical exam    Overview     - Lives in [place] with  (Joseph)   - Cologuard neg 10/2021 - next due 10/2024   - DECLINED SHINGRIX and TDAP           Current Assessment & Plan     PREVENTIVE CARE SCREENING:  - Labs: UTD  - Cologuard/ Colonoscopy: UTD - ordered ahead  - due in October   Vaccines:   - Shingles Vaccine (start at 50): Declined   - Pneumonia vax: UTD   - Tetanus (q10yrs): DECLINED  Women's health:  - Mammogram: UTD, ordered ahead for August   - DEXA scan: DUE, ordered today   Lifestyle Modification:  - Discussed DIET -   limit snacks, processed foods, sugary and greasy foods, fast foods. Increase healthy alternatives, whole grains, fruits vegetables.  - Encouraged to take daily multivitamin.    - Discussed EXERCISE -   Recommended weight training for bone health and 30 minutes of cardiovascular exercise 5-7 days a week.  - Encouraged pt to get yearly eye and dental exams          Relevant Orders    Comprehensive Metabolic Panel    Hemoglobin A1C    Lipid Panel    Advanced directives, counseling/discussion - Primary    Current Assessment & Plan     - I offered to discuss advanced directives with Cherie  today - she  voluntarily choose to proceed with the discussion   - Present for the discussion was: Cherie and myself  - I provided an explanation of living will, healthcare power of , DNR orders. Pt was given handout from [employer] to complete and return to be scanned into her medical record.  - If pt is unable to make their own medical decisions, she wishes for her , Joseph, to be consulted first as POA           Cardiac risk counseling    Current Assessment & Plan     - Cardiovascular risk was discussed today   - Pt's 10 year ASCVD risk today is 17.3%   - Lifestyle modifications were recommended, including nutritional choices, exercise, and elimination of habits contributing to risk.    - We agreed on a plan to reduce the current cardiovascular risk.    - Aspirin use/disuse was discussed after reviewing updated guidelines.   - CT cardiac score was ordered today          Relevant Orders    CT cardiac scoring wo IV contrast    At risk for decreased bone density    Overview     - Most recent DEXA was 10/19/21 with below results:   - Dual  Femur neck mean T score: -1.0 (normal)   - Dual femur total mean T score: -0.1 (normal)   - AP spine L1-L4 T score: 0.0 (normal)         Current Assessment & Plan     - Will check updated DEXA           Other Visit Diagnoses       Screening for colon cancer        Relevant Orders    Cologuard® colon cancer screening    Encounter for screening mammogram for breast cancer        Relevant Orders    BI mammo bilateral screening tomosynthesis    Screening for osteoporosis        Relevant Orders    XR DEXA bone density    Postmenopausal        Relevant Orders    XR DEXA bone density          Active Problem List  Patient Active Problem List   Diagnosis    Chronic neck pain    HTN (hypertension)    Vitamin D deficiency    Mild degeneration of cervical intervertebral disc    OA (osteoarthritis) of neck    Pre-diabetes    Cardiac arrhythmia    Cellulitis of neck    Elevated fasting glucose    Elevated MCV    Esophagitis due to doxycycline    Intertrigo    Tendinopathy of right rotator cuff    Traumatic rotator cuff tear    Pain in both knees    Annual physical exam    Advanced directives, counseling/discussion    Cardiac risk counseling    At risk for decreased bone density       Comprehensive Medical/Surgical/Social/Family History  Past Medical History:   Diagnosis Date    Irritant contact dermatitis due to plant 08/14/2023    Itchy skin 08/14/2023    Pain in right shoulder 01/27/2020    Acute pain of right shoulder    Personal history of other benign neoplasm     History of other benign neoplasm    Personal history of other diseases of the circulatory system     History of sinus bradycardia    Pruritic erythematous rash 08/14/2023     Past Surgical History:   Procedure Laterality Date    APPENDECTOMY      Appendectomy    WISDOM TOOTH EXTRACTION  2001    [place] tooth extraction     Social History     Social History Narrative    Not on file     Tobacco/Alcohol/Opioid use, as well as Illicit Drug Use: wine nightly      Allergies and Medications  Patient has no known allergies.  Current Outpatient Medications   Medication Instructions    cholecalciferol (Vitamin D-3) 50 mcg (2,000 unit) capsule 1 capsule, oral, Daily    hydroCHLOROthiazide (Microzide) 12.5 mg tablet Take one tablet once daily    hydrocortisone 2.5 % cream Topical    lisinopril 40 mg, oral, Daily    meloxicam (MOBIC) 15 mg, oral, Daily    methylPREDNISolone (Medrol Dospak) 4 mg tablets Follow schedule on package instructions     Medications and Supplements  prescribed by me and other practitioners or clinical pharmacist (such as prescriptions, OTC's, herbal therapies and supplements) were reviewed and documented in the medical record.      Activities of Daily Living  In your present state of health, do you have any difficulty performing the following activities?:   Preparing food and eating?: No  Bathing yourself: No  Getting dressed: No  Using the toilet:No  Moving around from place to place: No  In the past year have you fallen or had a near fall?:No  Able to manage finances independently: Yes  Able to perform grocery shopping: Yes  Able to manage medications independently: Yes  Able to do housework independently: Yes  Patient self-assessment of health status? Good    Patient Care Team:  Milka Frye PA-C as PCP - General (Family Medicine)  Star Malik DO as PCP - [facility] PCP     Dietary issues discussed: Yes  Hearing difficulties: No  Safe in current home environment: Yes  Visual Acuity assessed: No  Cognitive Impairment No    Depression Screening  Depression screening (15m) completed using the PHQ-2 questions with results documented in the chart/encounter  (Rooming Screening section for documentation, or note for additional information)    Cardiac Risk Assessment  Cardiovascular risk was discussed and, if needed, lifestyle modifications recommended, including nutritional choices, exercise, and elimination of habits  "contributing to risk.   We agreed on a plan to reduce the current cardiovascular risk based on above discussion as needed. We spent approximately 15 minutes on this discussion today.     Aspirin use/disuse was discussed and documented in the Problem List of the medical record (under Cardiac Risk Counseling) after reviewing the updated guidelines below:  Consider low dose Aspirin ( mg) use if the benefit for cardiovascular disease prevention outweighs risk for bleeding complications.   In general, low dose ASA should be considered:  In patients WITHOUT prior MI/stroke/PAD (primary prevention):   a. Age <60: Use if 10-year cardiovascular disease risk >20%, with discussion of risks and benefits with patient  b. Age 60-<70: Use if 10-year cardiovascular disease risk >20% and low bleeding (e.g., gastrointestinal) risk, with discussion of risks and benefits with patient  c. Age >=70: Do not use    In patients WITH prior MI/stroke/PAD (secondary prevention):   Generally use unless extremely high bleeding (e.g., gastrointenstinal) risk, with discussion of risks and benefits with patient    Advance Directives Discussion  Advanced Care Planning (including a Living Will, Healthcare POA, as well as specific end of life choices and/or directives), was discussed with the patient and/or surrogate, voluntarily, and details of that discussion documented in the Problem List (under Advanced Directives Discussion) of the medical record.    (~16 min spent discussing above)     ROS otherwise negative aside from what was mentioned above in HPI.    Vitals  /74   Pulse 63   Temp 36.2 °C (97.1 °F) (Temporal)   Resp 16   Ht 1.626 m (5' 4\")   Wt 73 kg (161 lb)   SpO2 98%   BMI 27.64 kg/m²   Body mass index is 27.64 kg/m².  Physical Exam  General:  alert, oriented, good hygiene, not disheveled. Well nourished.   No obvious skin rashes noted.   Normal gait     Mood is pleasant, not tearful, no signs of emotional distress.  Not " appearing intoxicated or altered.   No voiced delusions or paranoia,    Normal, appropriate behavior.    Respiratory: Equal breath sounds  No wheezes, rales, rhonchi  No respiratory distress.    Heart: Regular rate and rhythm, no murmurs      Extremities:   No edema     During the course of the visit the patient was educated and counseled about age appropriate screening and preventive services. Completed preventive screenings were documented in the chart and orders were placed for outstanding screenings/procedures as documented in the Assessment and Plan.    Patient Instructions (the written plan) was given to the patient at check out.

## 2024-06-15 ENCOUNTER — APPOINTMENT (OUTPATIENT)
Dept: RADIOLOGY | Facility: CLINIC | Age: 74
End: 2024-06-15
Payer: COMMERCIAL

## 2024-06-16 ENCOUNTER — HOSPITAL ENCOUNTER (OUTPATIENT)
Dept: RADIOLOGY | Facility: CLINIC | Age: 74
Discharge: HOME | End: 2024-06-16
Payer: COMMERCIAL

## 2024-06-16 DIAGNOSIS — Z71.89 CARDIAC RISK COUNSELING: ICD-10-CM

## 2024-06-16 PROCEDURE — 75571 CT HRT W/O DYE W/CA TEST: CPT

## 2024-08-26 ENCOUNTER — HOSPITAL ENCOUNTER (OUTPATIENT)
Dept: RADIOLOGY | Facility: HOSPITAL | Age: 74
Discharge: HOME | End: 2024-08-26
Payer: COMMERCIAL

## 2024-08-26 VITALS — WEIGHT: 158 LBS | BODY MASS INDEX: 26.98 KG/M2 | HEIGHT: 64 IN

## 2024-08-26 DIAGNOSIS — Z78.0 POSTMENOPAUSAL: ICD-10-CM

## 2024-08-26 DIAGNOSIS — Z12.31 ENCOUNTER FOR SCREENING MAMMOGRAM FOR BREAST CANCER: ICD-10-CM

## 2024-08-26 DIAGNOSIS — Z13.820 SCREENING FOR OSTEOPOROSIS: ICD-10-CM

## 2024-08-26 PROCEDURE — 77067 SCR MAMMO BI INCL CAD: CPT | Performed by: RADIOLOGY

## 2024-08-26 PROCEDURE — 77063 BREAST TOMOSYNTHESIS BI: CPT | Performed by: RADIOLOGY

## 2024-08-26 PROCEDURE — 77080 DXA BONE DENSITY AXIAL: CPT

## 2024-08-26 PROCEDURE — 77067 SCR MAMMO BI INCL CAD: CPT

## 2024-08-26 PROCEDURE — 77080 DXA BONE DENSITY AXIAL: CPT | Performed by: RADIOLOGY

## 2024-08-26 ASSESSMENT — LIFESTYLE VARIABLES
CURRENT_SMOKER: N
3_OR_MORE_DRINKS_PER_DAY: N

## 2024-09-11 ENCOUNTER — LAB (OUTPATIENT)
Dept: LAB | Facility: LAB | Age: 74
End: 2024-09-11
Payer: COMMERCIAL

## 2024-09-11 DIAGNOSIS — E55.9 VITAMIN D DEFICIENCY: ICD-10-CM

## 2024-09-11 DIAGNOSIS — Z00.00 ANNUAL PHYSICAL EXAM: ICD-10-CM

## 2024-09-11 LAB
25(OH)D3 SERPL-MCNC: 58 NG/ML (ref 30–100)
ALBUMIN SERPL BCP-MCNC: 4.6 G/DL (ref 3.4–5)
ALP SERPL-CCNC: 56 U/L (ref 33–136)
ALT SERPL W P-5'-P-CCNC: 12 U/L (ref 7–45)
ANION GAP SERPL CALC-SCNC: 14 MMOL/L (ref 10–20)
AST SERPL W P-5'-P-CCNC: 16 U/L (ref 9–39)
BILIRUB SERPL-MCNC: 0.7 MG/DL (ref 0–1.2)
BUN SERPL-MCNC: 21 MG/DL (ref 6–23)
CALCIUM SERPL-MCNC: 9.9 MG/DL (ref 8.6–10.3)
CHLORIDE SERPL-SCNC: 102 MMOL/L (ref 98–107)
CHOLEST SERPL-MCNC: 241 MG/DL (ref 0–199)
CHOLESTEROL/HDL RATIO: 2.5
CO2 SERPL-SCNC: 28 MMOL/L (ref 21–32)
CREAT SERPL-MCNC: 0.91 MG/DL (ref 0.5–1.05)
EGFRCR SERPLBLD CKD-EPI 2021: 66 ML/MIN/1.73M*2
EST. AVERAGE GLUCOSE BLD GHB EST-MCNC: 108 MG/DL
GLUCOSE SERPL-MCNC: 97 MG/DL (ref 74–99)
HBA1C MFR BLD: 5.4 %
HDLC SERPL-MCNC: 97.9 MG/DL
LDLC SERPL CALC-MCNC: 129 MG/DL
NON HDL CHOLESTEROL: 143 MG/DL (ref 0–149)
POTASSIUM SERPL-SCNC: 5 MMOL/L (ref 3.5–5.3)
PROT SERPL-MCNC: 6.7 G/DL (ref 6.4–8.2)
SODIUM SERPL-SCNC: 139 MMOL/L (ref 136–145)
TRIGL SERPL-MCNC: 69 MG/DL (ref 0–149)
VLDL: 14 MG/DL (ref 0–40)

## 2024-09-11 PROCEDURE — 36415 COLL VENOUS BLD VENIPUNCTURE: CPT

## 2024-09-16 ENCOUNTER — APPOINTMENT (OUTPATIENT)
Dept: PRIMARY CARE | Facility: CLINIC | Age: 74
End: 2024-09-16
Payer: COMMERCIAL

## 2024-09-16 VITALS
BODY MASS INDEX: 27.18 KG/M2 | SYSTOLIC BLOOD PRESSURE: 130 MMHG | HEIGHT: 64 IN | TEMPERATURE: 96.8 F | HEART RATE: 51 BPM | DIASTOLIC BLOOD PRESSURE: 82 MMHG | RESPIRATION RATE: 16 BRPM | WEIGHT: 159.2 LBS | OXYGEN SATURATION: 99 %

## 2024-09-16 DIAGNOSIS — M85.852 OSTEOPENIA OF LEFT HIP: ICD-10-CM

## 2024-09-16 DIAGNOSIS — I10 PRIMARY HYPERTENSION: Primary | ICD-10-CM

## 2024-09-16 PROCEDURE — 3079F DIAST BP 80-89 MM HG: CPT | Performed by: PHYSICIAN ASSISTANT

## 2024-09-16 PROCEDURE — 1036F TOBACCO NON-USER: CPT | Performed by: PHYSICIAN ASSISTANT

## 2024-09-16 PROCEDURE — 3075F SYST BP GE 130 - 139MM HG: CPT | Performed by: PHYSICIAN ASSISTANT

## 2024-09-16 PROCEDURE — 1123F ACP DISCUSS/DSCN MKR DOCD: CPT | Performed by: PHYSICIAN ASSISTANT

## 2024-09-16 PROCEDURE — 99213 OFFICE O/P EST LOW 20 MIN: CPT | Performed by: PHYSICIAN ASSISTANT

## 2024-09-16 PROCEDURE — 1159F MED LIST DOCD IN RCRD: CPT | Performed by: PHYSICIAN ASSISTANT

## 2024-09-16 PROCEDURE — 1160F RVW MEDS BY RX/DR IN RCRD: CPT | Performed by: PHYSICIAN ASSISTANT

## 2024-09-16 PROCEDURE — 3008F BODY MASS INDEX DOCD: CPT | Performed by: PHYSICIAN ASSISTANT

## 2024-09-16 ASSESSMENT — ENCOUNTER SYMPTOMS
SHORTNESS OF BREATH: 0
PALPITATIONS: 0

## 2024-09-16 NOTE — ASSESSMENT & PLAN NOTE
-Has been stable and well controlled  - Continue current tx plan   - Congratulated on healthy diet and regular exercise and encouraged she continue this as well   - Follow up annually

## 2024-09-16 NOTE — PROGRESS NOTES
"Subjective   Patient ID: Cherie Eugene is a 74 y.o. female who presents for 4 month follow up (Review labs).    HPI     HTN follow up   - Last visit added hydrochlorothiazide 12.5 mg as BP was still elevated despite her lisinopril 40 mg   - BP today improved 130/82 mmHg   - Blood work all came back looking great  - Has been drinking organic beet juice and eating more blueberries   - Not much salt   - 2 cups of coffee a day   - No soda, very minimal sugar     Review of Systems   Respiratory:  Negative for shortness of breath.    Cardiovascular:  Negative for chest pain, palpitations and leg swelling.     Objective   /82 (BP Location: Right arm, Patient Position: Sitting, BP Cuff Size: Adult)   Pulse 51   Temp 36 °C (96.8 °F) (Temporal)   Resp 16   Ht 1.626 m (5' 4\")   Wt 72.2 kg (159 lb 3.2 oz)   SpO2 99%   BMI 27.33 kg/m²     Physical Exam  Constitutional:       Appearance: Normal appearance.   Cardiovascular:      Rate and Rhythm: Normal rate and regular rhythm.      Pulses: Normal pulses.      Heart sounds: Normal heart sounds. No murmur heard.  Pulmonary:      Effort: Pulmonary effort is normal.      Breath sounds: Normal breath sounds.   Neurological:      Mental Status: She is alert.   Psychiatric:         Mood and Affect: Mood and affect normal.         Behavior: Behavior normal.         Thought Content: Thought content normal.         Judgment: Judgment normal.       Assessment/Plan     Problem List Items Addressed This Visit       Primary hypertension - Primary    Overview     - Current med: lisinopril 40 mg, hydrochlorothiazide 12.5 mg          Current Assessment & Plan     -Has been stable and well controlled  - Continue current tx plan   - Congratulated on healthy diet and regular exercise and encouraged she continue this as well   - Follow up annually          Osteopenia of left hip    Overview     DEXA   - L1-L4 T-score: -0.2 (normal)   - Left femur total T-score: -0.7 (normal)   - Left " femur neck T-score: -1.1 (osteopenia, mild)          Current Assessment & Plan     - Educated the pt about the dx  - Recommend continue with her vitamin D supplement and regular exercise

## 2024-09-16 NOTE — ASSESSMENT & PLAN NOTE
- Educated the pt about the dx  - Recommend continue with her vitamin D supplement and regular exercise

## 2025-02-21 ENCOUNTER — OFFICE VISIT (OUTPATIENT)
Dept: PRIMARY CARE | Facility: CLINIC | Age: 75
End: 2025-02-21
Payer: MEDICARE

## 2025-02-21 VITALS
RESPIRATION RATE: 16 BRPM | OXYGEN SATURATION: 99 % | SYSTOLIC BLOOD PRESSURE: 150 MMHG | TEMPERATURE: 98 F | BODY MASS INDEX: 27.4 KG/M2 | DIASTOLIC BLOOD PRESSURE: 62 MMHG | HEART RATE: 54 BPM | WEIGHT: 160.5 LBS | HEIGHT: 64 IN

## 2025-02-21 DIAGNOSIS — H81.10 BENIGN PAROXYSMAL POSITIONAL VERTIGO, UNSPECIFIED LATERALITY: Primary | ICD-10-CM

## 2025-02-21 DIAGNOSIS — I10 PRIMARY HYPERTENSION: ICD-10-CM

## 2025-02-21 PROCEDURE — 3078F DIAST BP <80 MM HG: CPT | Performed by: PHYSICIAN ASSISTANT

## 2025-02-21 PROCEDURE — 1159F MED LIST DOCD IN RCRD: CPT | Performed by: PHYSICIAN ASSISTANT

## 2025-02-21 PROCEDURE — 1123F ACP DISCUSS/DSCN MKR DOCD: CPT | Performed by: PHYSICIAN ASSISTANT

## 2025-02-21 PROCEDURE — 3008F BODY MASS INDEX DOCD: CPT | Performed by: PHYSICIAN ASSISTANT

## 2025-02-21 PROCEDURE — 3077F SYST BP >= 140 MM HG: CPT | Performed by: PHYSICIAN ASSISTANT

## 2025-02-21 PROCEDURE — 99213 OFFICE O/P EST LOW 20 MIN: CPT | Performed by: PHYSICIAN ASSISTANT

## 2025-02-21 ASSESSMENT — ENCOUNTER SYMPTOMS
VOMITING: 0
SHORTNESS OF BREATH: 0
COUGH: 0
FATIGUE: 0
ACTIVITY CHANGE: 0
SORE THROAT: 0
UNEXPECTED WEIGHT CHANGE: 0
DYSURIA: 0
DIARRHEA: 0
NAUSEA: 0
APPETITE CHANGE: 0
ABDOMINAL PAIN: 0
CONFUSION: 0
FEVER: 0
ARTHRALGIAS: 1
HEADACHES: 0
BLOOD IN STOOL: 0
WEAKNESS: 0
NERVOUS/ANXIOUS: 0
HEMATURIA: 0
DIZZINESS: 1

## 2025-02-21 NOTE — PROGRESS NOTES
"Subjective   Patient ID: Cherie Eugene is a 74 y.o. female who presents for Dizziness (Patient states when she bends down she gets dizzy and when she lays down the whole room is spinning. Patient states she turns to lays on her side which helps but she gets stuffy laying on her side. This has been ongoing for 1 week.)    HPI     Dizziness:  - Description: room spinning, lasting a few minutes  - Aggravated by: bending over, right when she lays down into bed  - Onset: 1 week ago  - History of anemia: none  - Recently sick: none  - Eating/drinking: yes, eats breakfast and dinner at 3:30p, eats a small snack after dinner, 2-3 glasses of water per day  - CP/SOB: none  - Weakness: none  - N/V: none  - Tinnitus: not new  - Labs: 9/2024, last CBC 12/2023    - Med Compliance: lisinopril used to take it everyday, concerned with her BP readings, has been inconsistently takes BP meds    Review of Systems   Constitutional:  Negative for activity change, appetite change, fatigue, fever and unexpected weight change.   HENT:  Positive for tinnitus. Negative for congestion and sore throat.    Respiratory:  Negative for cough and shortness of breath.    Cardiovascular:  Negative for chest pain.   Gastrointestinal:  Negative for abdominal pain, blood in stool, diarrhea, nausea and vomiting.   Genitourinary:  Negative for dysuria, hematuria, pelvic pain and urgency.   Musculoskeletal:  Positive for arthralgias.   Neurological:  Positive for dizziness. Negative for syncope, weakness and headaches.   Psychiatric/Behavioral:  Negative for confusion. The patient is not nervous/anxious.        Objective   /62   Pulse 54   Temp 36.7 °C (98 °F) (Temporal)   Resp 16   Ht 1.626 m (5' 4\")   Wt 72.8 kg (160 lb 8 oz)   SpO2 99%   BMI 27.55 kg/m²     Physical Exam  Constitutional:       General: She is not in acute distress.     Appearance: Normal appearance. She is not ill-appearing.   HENT:      Right Ear: Tympanic membrane, ear " canal and external ear normal.      Left Ear: Tympanic membrane, ear canal and external ear normal.      Nose: Nose normal. No congestion.      Mouth/Throat:      Mouth: Mucous membranes are moist.      Pharynx: Oropharynx is clear.   Eyes:      Extraocular Movements: Extraocular movements intact.      Conjunctiva/sclera: Conjunctivae normal.      Pupils: Pupils are equal, round, and reactive to light.   Cardiovascular:      Rate and Rhythm: Normal rate and regular rhythm.      Pulses: Normal pulses.      Heart sounds: Normal heart sounds. No murmur heard.  Pulmonary:      Effort: Pulmonary effort is normal. No respiratory distress.      Breath sounds: Normal breath sounds. No wheezing or rales.   Abdominal:      General: Abdomen is flat.   Musculoskeletal:      Cervical back: Normal range of motion.   Skin:     General: Skin is warm and dry.   Neurological:      General: No focal deficit present.      Mental Status: She is alert and oriented to person, place, and time.      Motor: No weakness.      Coordination: Coordination normal.   Psychiatric:         Mood and Affect: Mood normal.         Behavior: Behavior normal.         Assessment/Plan   Problem List Items Addressed This Visit       Primary hypertension     Recommended pt take the 40 mg lisinopril daily for the next week and report BP readings.          Benign paroxysmal positional vertigo - Primary     Recommended pt try the Epley maneuver to improve sxs. Recommended pt try meclizine and dramamine prn.

## 2025-02-21 NOTE — ASSESSMENT & PLAN NOTE
Recommended pt try the Epley maneuver to improve sxs. Recommended pt try meclizine and dramamine prn.

## 2025-03-13 ENCOUNTER — APPOINTMENT (OUTPATIENT)
Dept: PRIMARY CARE | Facility: CLINIC | Age: 75
End: 2025-03-13
Payer: COMMERCIAL

## 2025-06-13 ENCOUNTER — OFFICE VISIT (OUTPATIENT)
Dept: PRIMARY CARE | Facility: CLINIC | Age: 75
End: 2025-06-13
Payer: MEDICARE

## 2025-06-13 VITALS
TEMPERATURE: 98.3 F | HEART RATE: 50 BPM | SYSTOLIC BLOOD PRESSURE: 143 MMHG | HEIGHT: 64 IN | OXYGEN SATURATION: 97 % | WEIGHT: 154.3 LBS | BODY MASS INDEX: 26.34 KG/M2 | RESPIRATION RATE: 16 BRPM | DIASTOLIC BLOOD PRESSURE: 69 MMHG

## 2025-06-13 DIAGNOSIS — J20.9 ACUTE BRONCHITIS, UNSPECIFIED ORGANISM: Primary | ICD-10-CM

## 2025-06-13 PROCEDURE — 1160F RVW MEDS BY RX/DR IN RCRD: CPT | Performed by: PHYSICIAN ASSISTANT

## 2025-06-13 PROCEDURE — 1159F MED LIST DOCD IN RCRD: CPT | Performed by: PHYSICIAN ASSISTANT

## 2025-06-13 PROCEDURE — 99213 OFFICE O/P EST LOW 20 MIN: CPT | Performed by: PHYSICIAN ASSISTANT

## 2025-06-13 PROCEDURE — 1036F TOBACCO NON-USER: CPT | Performed by: PHYSICIAN ASSISTANT

## 2025-06-13 PROCEDURE — 3077F SYST BP >= 140 MM HG: CPT | Performed by: PHYSICIAN ASSISTANT

## 2025-06-13 PROCEDURE — 3078F DIAST BP <80 MM HG: CPT | Performed by: PHYSICIAN ASSISTANT

## 2025-06-13 PROCEDURE — 3008F BODY MASS INDEX DOCD: CPT | Performed by: PHYSICIAN ASSISTANT

## 2025-06-13 RX ORDER — AZITHROMYCIN 250 MG/1
TABLET, FILM COATED ORAL
Qty: 6 TABLET | Refills: 0 | Status: SHIPPED | OUTPATIENT
Start: 2025-06-13

## 2025-06-13 NOTE — PROGRESS NOTES
"Subjective   Patient ID: Cherie Eugene is a 74 y.o. female who presents for Illness (Patient states ongoing x 2 weeks. Patient stated started with headaches then sore throat and coughing and fatigue. Patient states she azeb pain in her chest when breathing couple days ago but has gotten better. Patient has tried otc cough syrup to help with the cough at night. ).    HPI     Cough:   - Started with severe headache the the next day sore throat   - The evening started feeling sick with cough, sore throat, very tired, stuffy nose   - Tried Coricidin HBP which helped her sleep   - No SOB   - Sxs slightly improved   - Has ha pneumonia once before for a whole month       Review of Systems  Nothing concerning except as noted in the HPI    Objective   /69 (BP Location: Left arm, Patient Position: Sitting, BP Cuff Size: Small adult)   Pulse 50   Temp 36.8 °C (98.3 °F) (Temporal)   Resp 16   Ht 1.626 m (5' 4\")   Wt 70 kg (154 lb 4.8 oz)   SpO2 97%   BMI 26.49 kg/m²     Physical Exam  Constitutional:       Appearance: Normal appearance.   Cardiovascular:      Rate and Rhythm: Normal rate and regular rhythm.      Pulses: Normal pulses.      Heart sounds: Normal heart sounds. No murmur heard.  Pulmonary:      Effort: Pulmonary effort is normal.      Breath sounds: Normal breath sounds.   Musculoskeletal:      Right lower leg: No edema.      Left lower leg: No edema.   Neurological:      Mental Status: She is alert.   Psychiatric:         Mood and Affect: Mood and affect normal.         Assessment/Plan     Problem List Items Addressed This Visit    None  Visit Diagnoses         Acute bronchitis, unspecified organism    -  Primary    Relevant Medications    azithromycin (Zithromax Z-Atul) 250 mg tablet            Follow up if sxs worsen or any new concerns               Cherie Eugene - be aware that any referrals discussed should be placed today and tests that were ordered should result in prompt scheduling today.   If " not done today-then a phone call for scheduling is expected in a timely manner (within 2 weeks).   If testing is to be done-a result should be available to patient within 2 weeks time unless otherwise specified.   You, the patient or caregiver, are responsible for making sure that what was discussed is actually scheduled and completed.  If suboptimal understanding of results of tests or referral reason-a follow up appointment with me should be made.  If above does not occur-you are to connect with us for an explanation.

## 2025-06-19 ENCOUNTER — TELEPHONE (OUTPATIENT)
Dept: PRIMARY CARE | Facility: CLINIC | Age: 75
End: 2025-06-19

## 2025-06-19 DIAGNOSIS — J20.9 ACUTE BRONCHITIS, UNSPECIFIED ORGANISM: Primary | ICD-10-CM

## 2025-06-19 RX ORDER — BENZONATATE 200 MG/1
200 CAPSULE ORAL 3 TIMES DAILY PRN
Qty: 30 CAPSULE | Refills: 0 | Status: SHIPPED | OUTPATIENT
Start: 2025-06-19 | End: 2025-06-29

## 2025-06-19 RX ORDER — BROMPHENIRAMINE MALEATE, PSEUDOEPHEDRINE HYDROCHLORIDE, AND DEXTROMETHORPHAN HYDROBROMIDE 2; 30; 10 MG/5ML; MG/5ML; MG/5ML
5 SYRUP ORAL 4 TIMES DAILY PRN
Qty: 473 ML | Refills: 0 | Status: SHIPPED | OUTPATIENT
Start: 2025-06-19

## 2025-07-18 DIAGNOSIS — I10 PRIMARY HYPERTENSION: ICD-10-CM

## 2025-07-21 RX ORDER — LISINOPRIL 40 MG/1
40 TABLET ORAL DAILY
Qty: 90 TABLET | Refills: 1 | Status: SHIPPED | OUTPATIENT
Start: 2025-07-21

## 2025-07-21 NOTE — TELEPHONE ENCOUNTER
Pt states she called the refill line last week and nothing was sent.  She needs a refill for Lisinopril 40 mg sent to [facility] in [place].

## 2025-08-06 DIAGNOSIS — Z12.12 SCREENING FOR COLORECTAL CANCER: ICD-10-CM

## 2025-08-06 DIAGNOSIS — Z12.11 SCREENING FOR COLORECTAL CANCER: ICD-10-CM

## 2025-08-26 ENCOUNTER — PATIENT MESSAGE (OUTPATIENT)
Dept: CARE COORDINATION | Facility: CLINIC | Age: 75
End: 2025-08-26
Payer: MEDICARE

## 2025-09-22 ENCOUNTER — APPOINTMENT (OUTPATIENT)
Dept: PRIMARY CARE | Facility: CLINIC | Age: 75
End: 2025-09-22
Payer: COMMERCIAL